# Patient Record
Sex: MALE | NOT HISPANIC OR LATINO
[De-identification: names, ages, dates, MRNs, and addresses within clinical notes are randomized per-mention and may not be internally consistent; named-entity substitution may affect disease eponyms.]

---

## 2018-03-22 ENCOUNTER — CHARTING TRANS (OUTPATIENT)
Dept: OTHER | Age: 48
End: 2018-03-22

## 2018-03-22 ENCOUNTER — MYAURORA ACCOUNT LINK (OUTPATIENT)
Dept: OTHER | Age: 48
End: 2018-03-22

## 2018-03-22 ASSESSMENT — PAIN SCALES - GENERAL: PAINLEVEL_OUTOF10: 3

## 2018-03-23 ENCOUNTER — LAB SERVICES (OUTPATIENT)
Dept: OTHER | Age: 48
End: 2018-03-23

## 2018-03-23 LAB
C DIFF TOX A+B STL QL IA: NEGATIVE
C PARVUM AG STL QL IA: NEGATIVE
G LAMBLIA AG STL QL IA: NEGATIVE

## 2018-03-26 LAB — FINAL REPORT: NORMAL

## 2019-03-06 VITALS
DIASTOLIC BLOOD PRESSURE: 84 MMHG | TEMPERATURE: 97.8 F | RESPIRATION RATE: 14 BRPM | SYSTOLIC BLOOD PRESSURE: 136 MMHG | HEART RATE: 72 BPM | OXYGEN SATURATION: 99 %

## 2021-08-17 ENCOUNTER — TELEPHONE (OUTPATIENT)
Dept: ORTHOPEDICS CLINIC | Facility: CLINIC | Age: 51
End: 2021-08-17

## 2021-08-17 NOTE — TELEPHONE ENCOUNTER
Past patient of Dr Veena Blair has nerve pain in his right shoulder blade going down his arm and into two of his fingers for the last 3 days. Pain has improved some with visits to his chiropractor but he is still uncomfortable.  Can Dr Juan Beverly see patient s

## 2021-08-20 ENCOUNTER — TELEPHONE (OUTPATIENT)
Dept: ORTHOPEDICS CLINIC | Facility: CLINIC | Age: 51
End: 2021-08-20

## 2021-08-20 DIAGNOSIS — M25.511 RIGHT SHOULDER PAIN, UNSPECIFIED CHRONICITY: Primary | ICD-10-CM

## 2021-08-20 NOTE — TELEPHONE ENCOUNTER
Requisite for xray order  Reviewed patients chart, xray orders are required. Order placed for right shoulder xrays    • Spoke to patient informed to arrive 20 mins prior to patients appt to complete x-ray order.  Patient verbalized understanding and agreeme

## 2021-08-20 NOTE — TELEPHONE ENCOUNTER
I would like to see patient first to see where pain is coming from then I will send for xrays thank you!

## 2021-08-20 NOTE — TELEPHONE ENCOUNTER
Patient is scheduled with Carmela Parrish.     Future Appointments   Date Time Provider Nathalie Sin   8/25/2021  8:30 AM Aleena Terrell PA-C [facility]

## 2021-08-20 NOTE — TELEPHONE ENCOUNTER
Patient with nerve pain in right shoulder radiating down arm into two of his fingers. Please order xrays if needed.     Future Appointments   Date Time Provider Nathalie Sin   8/25/2021  8:30 AM Lexie Terrell PA-C [facility]

## 2021-08-25 ENCOUNTER — OFFICE VISIT (OUTPATIENT)
Dept: ORTHOPEDICS CLINIC | Facility: CLINIC | Age: 51
End: 2021-08-25
Payer: COMMERCIAL

## 2021-08-25 ENCOUNTER — HOSPITAL ENCOUNTER (OUTPATIENT)
Dept: GENERAL RADIOLOGY | Age: 51
Discharge: HOME | End: 2021-08-25
Attending: PHYSICIAN ASSISTANT
Payer: COMMERCIAL

## 2021-08-25 VITALS — HEIGHT: 71 IN | BODY MASS INDEX: 28.7 KG/M2 | WEIGHT: 205 LBS

## 2021-08-25 DIAGNOSIS — M25.511 RIGHT SHOULDER PAIN, UNSPECIFIED CHRONICITY: Primary | ICD-10-CM

## 2021-08-25 DIAGNOSIS — M54.12 CERVICAL RADICULOPATHY: ICD-10-CM

## 2021-08-25 DIAGNOSIS — M25.511 RIGHT SHOULDER PAIN, UNSPECIFIED CHRONICITY: ICD-10-CM

## 2021-08-25 PROCEDURE — 3008F BODY MASS INDEX DOCD: CPT | Performed by: PHYSICIAN ASSISTANT

## 2021-08-25 PROCEDURE — 73030 X-RAY EXAM OF SHOULDER: CPT | Performed by: PHYSICIAN ASSISTANT

## 2021-08-25 PROCEDURE — 99202 OFFICE O/P NEW SF 15 MIN: CPT | Performed by: PHYSICIAN ASSISTANT

## 2021-08-25 RX ORDER — GABAPENTIN 300 MG/1
CAPSULE ORAL
COMMUNITY
Start: 2021-08-23

## 2021-08-25 RX ORDER — MELOXICAM 15 MG/1
15 TABLET ORAL DAILY PRN
COMMUNITY
Start: 2021-08-23

## 2021-08-25 NOTE — PROGRESS NOTES
[facility] New Patient Note    CC: Right shoulder pain with neck pain and right arm numbness    HPI: This 45year old male presents today with complaints of right shoulder, right arm, and neck pain.   He has noted intermittent right shoulder pain with Substance and Sexual Activity      Alcohol use: Yes      Drug use: Never      Sexual activity: Not on file       ROS:  Complete review of symptoms was reviewed and is non-contributory to the chief complaint as mentioned above.       Physical Exam: He is a the right shoulder today however he will follow-up with me after treatment and evaluation for cervical spine if he has persistent right shoulder issues. He understands and will follow up with me as needed.   The x-rays of the right shoulder were reviewed a

## 2023-03-02 ENCOUNTER — TELEPHONE (OUTPATIENT)
Dept: ORTHOPEDICS CLINIC | Facility: CLINIC | Age: 53
End: 2023-03-02

## 2023-03-02 DIAGNOSIS — M25.562 LEFT KNEE PAIN, UNSPECIFIED CHRONICITY: Primary | ICD-10-CM

## 2023-03-02 DIAGNOSIS — Z01.89 ENCOUNTER FOR LOWER EXTREMITY COMPARISON IMAGING STUDY: ICD-10-CM

## 2023-03-02 NOTE — TELEPHONE ENCOUNTER
Patient is scheduled with Dr. Latonia Gooden for left knee pain. Please advise if imaging is needed.

## 2023-03-02 NOTE — TELEPHONE ENCOUNTER
Ordered xrays for both knees (rt knee is for comparison views only.) please schedule with pt. Thank you!

## 2023-03-02 NOTE — TELEPHONE ENCOUNTER
Future Appointments   Date Time Provider Nathalie January   4/19/2023  8:15 AM [facility] IC [facility] [place]   4/19/2023  9:20 AM Julieta Farley MD [facility] [place]   4/19/2023  9:45 AM [facility]R IC [facility] [place]       Patient is scheduled for xray and LM to complete prior to appt.